# Patient Record
Sex: FEMALE | Race: WHITE | URBAN - METROPOLITAN AREA
[De-identification: names, ages, dates, MRNs, and addresses within clinical notes are randomized per-mention and may not be internally consistent; named-entity substitution may affect disease eponyms.]

---

## 2017-09-25 ENCOUNTER — HOSPITAL ENCOUNTER (OUTPATIENT)
Dept: SURGERY | Age: 82
Discharge: HOME | End: 2017-09-25
Attending: OPHTHALMOLOGY | Admitting: OPHTHALMOLOGY

## 2017-09-25 VITALS
OXYGEN SATURATION: 97 % | SYSTOLIC BLOOD PRESSURE: 151 MMHG | TEMPERATURE: 98.1 F | HEART RATE: 92 BPM | DIASTOLIC BLOOD PRESSURE: 71 MMHG | RESPIRATION RATE: 14 BRPM

## 2017-09-25 LAB
GLUCOSE BLD-MCNC: 171 MG/DL (ref 70–99)
GLUCOSE BLD-MCNC: 171 MG/DL (ref 70–99)
PERFORMED ON: ABNORMAL
PERFORMED ON: ABNORMAL

## 2017-09-25 RX ORDER — CIPROFLOXACIN HYDROCHLORIDE 3.5 MG/ML
1 SOLUTION/ DROPS TOPICAL SEE ADMIN INSTRUCTIONS
Status: DISCONTINUED | OUTPATIENT
Start: 2017-09-25 | End: 2017-09-26 | Stop reason: HOSPADM

## 2017-09-25 RX ORDER — SODIUM CHLORIDE 0.9 % (FLUSH) 0.9 %
10 SYRINGE (ML) INJECTION PRN
Status: DISCONTINUED | OUTPATIENT
Start: 2017-09-25 | End: 2017-09-25 | Stop reason: SDUPTHER

## 2017-09-25 RX ORDER — TETRACAINE HYDROCHLORIDE 5 MG/ML
1 SOLUTION OPHTHALMIC ONCE
Status: COMPLETED | OUTPATIENT
Start: 2017-09-25 | End: 2017-09-25

## 2017-09-25 RX ORDER — SODIUM CHLORIDE 0.9 % (FLUSH) 0.9 %
10 SYRINGE (ML) INJECTION PRN
Status: DISCONTINUED | OUTPATIENT
Start: 2017-09-25 | End: 2017-09-26 | Stop reason: HOSPADM

## 2017-09-25 RX ORDER — ONDANSETRON 2 MG/ML
4 INJECTION INTRAMUSCULAR; INTRAVENOUS
Status: ACTIVE | OUTPATIENT
Start: 2017-09-25 | End: 2017-09-25

## 2017-09-25 RX ORDER — SODIUM CHLORIDE 0.9 % (FLUSH) 0.9 %
10 SYRINGE (ML) INJECTION EVERY 12 HOURS SCHEDULED
Status: DISCONTINUED | OUTPATIENT
Start: 2017-09-25 | End: 2017-09-26 | Stop reason: HOSPADM

## 2017-09-25 RX ORDER — SODIUM CHLORIDE 0.9 % (FLUSH) 0.9 %
10 SYRINGE (ML) INJECTION EVERY 12 HOURS SCHEDULED
Status: DISCONTINUED | OUTPATIENT
Start: 2017-09-25 | End: 2017-09-25 | Stop reason: SDUPTHER

## 2017-09-25 RX ORDER — SODIUM CHLORIDE 9 MG/ML
INJECTION, SOLUTION INTRAVENOUS CONTINUOUS
Status: DISCONTINUED | OUTPATIENT
Start: 2017-09-25 | End: 2017-09-26 | Stop reason: HOSPADM

## 2017-09-25 RX ADMIN — Medication 0.1 ML: at 09:29

## 2017-09-25 RX ADMIN — CIPROFLOXACIN HYDROCHLORIDE 1 DROP: 3.5 SOLUTION/ DROPS TOPICAL at 09:29

## 2017-09-25 RX ADMIN — TETRACAINE HYDROCHLORIDE 1 DROP: 5 SOLUTION OPHTHALMIC at 09:29

## 2017-09-25 RX ADMIN — SODIUM CHLORIDE: 9 INJECTION, SOLUTION INTRAVENOUS at 09:34

## 2017-09-25 ASSESSMENT — PAIN SCALES - GENERAL
PAINLEVEL_OUTOF10: 0
PAINLEVEL_OUTOF10: 0

## 2017-09-25 ASSESSMENT — PAIN - FUNCTIONAL ASSESSMENT: PAIN_FUNCTIONAL_ASSESSMENT: 0-10

## 2017-09-25 ASSESSMENT — ENCOUNTER SYMPTOMS: SHORTNESS OF BREATH: 0

## 2017-10-08 ASSESSMENT — ENCOUNTER SYMPTOMS: SHORTNESS OF BREATH: 0

## 2017-10-08 NOTE — ANESTHESIA PRE-OP
Department of Anesthesiology  Preprocedure Note       Name:  Isrrael Simon   Age:  80 y.o.  :  1935                                          MRN:  [id number]         Date:  10/9/2017          [facility] Department of Anesthesiology  Pre-Anesthesia Evaluation/Consultation       Name:  Isrrael Simon                                         Age:  80 y.o. MRN:  [id number]           Procedure (Scheduled):  Right eye phaco IOL  Surgeon:  Dr. Abdoul Miller Antibiotics     Tetanus Toxoids Swelling    Tolinase [Tolazamide] Other (See Comments)     BLINDNESS    Erythromycin Rash    Pcn [Penicillins] Rash     There is no problem list on file for this patient. Past Medical History:   Diagnosis Date    Diabetes mellitus ([facility] [address]     Hyperlipidemia     Hypertension      Past Surgical History:   Procedure Laterality Date    CATARACT REMOVAL WITH IMPLANT Left     HYSTERECTOMY       Social History   Substance Use Topics    Smoking status: Never Smoker    Smokeless tobacco: Not on file    Alcohol use No     Medications  Current Outpatient Prescriptions on File Prior to Encounter   Medication Sig Dispense Refill    lovastatin (MEVACOR) 40 MG tablet Take 40 mg by mouth 2 times daily      metFORMIN (GLUCOPHAGE) 500 MG tablet Take 500 mg by mouth 2 times daily (with meals)      amLODIPine (NORVASC) 5 MG tablet Take 5 mg by mouth daily      loratadine (CLARITIN) 10 MG capsule Take 10 mg by mouth daily       No current facility-administered medications on file prior to encounter.       Current Outpatient Prescriptions   Medication Sig Dispense Refill    lovastatin (MEVACOR) 40 MG tablet Take 40 mg by mouth 2 times daily      metFORMIN (GLUCOPHAGE) 500 MG tablet Take 500 mg by mouth 2 times daily (with meals)      amLODIPine (NORVASC) 5 MG tablet Take 5 mg by mouth daily      loratadine (CLARITIN) 10 MG capsule Take 10 mg by mouth daily       Current Facility-Administered Medications   Medication Dose Route Frequency Provider Last Rate Last Dose    0.9 % sodium chloride infusion   Intravenous Continuous Teresa Hawkins MD        sodium chloride flush 0.9 % injection 10 mL  10 mL Intravenous 2 times per day Teresa Hawkins MD        sodium chloride flush 0.9 % injection 10 mL  10 mL Intravenous PRN Teresa Hawkins MD        famotidine (PEPCID) injection 20 mg  20 mg Intravenous Once Teresa Hawkins MD        povidone-iodine 5 % ophthalmic solution   Right Eye Once MD Marianela        ciprofloxacin (CILOXAN) 0.3 % ophthalmic solution 1 drop  1 drop Right Eye See [address], [place]   1 drop at 10/09/17 1028    cyclopentolate 1%, phenylephrine 2.5%, tropicamide 1%, ketorolac 0.5% ophthalmic solution  0.5 mL Right Eye See [address], [place]        lidocaine (AKTEN) 3.5 % ophthalmic gel   Right Eye See [address], [place]   1 drop at 10/09/17 1026    famotidine (PEPCID) 20 MG/2ML injection              Vital Signs (Current)   Vitals:    10/09/17 1017   BP: 179/80   Pulse: 96   Resp:    Temp:    SpO2:      Vital Signs Statistics (for past 48 hrs)     Temp  Av.6 °F (37 °C)  Min: 98.6 °F (37 °C)   Min taken time: 10/09/17 1013  Max: 98.6 °F (37 °C)   Max taken time: 10/09/17 1013  Pulse  Av  Min: 80   Min taken time: 10/09/17 1017  Max: 98   Max taken time: 10/09/17 1013  Resp  Av  Min: 23   Min taken time: 10/09/17 1013  Max: 23   Max taken time: 10/09/17 1013  BP  Min: 179/80   Min taken time: 10/09/17 1013  Max: 179/80   Max taken time: 10/09/17 1013  SpO2  Av %  Min: 100 %   Min taken time: 10/09/17 1013  Max: 100 %   Max taken time: 10/09/17 1013    BP Readings from Last 3 Encounters:   10/09/17 (!) 179/80   17 (!) 151/71     BMI  Body mass index is 29.29 kg/m².   Estimated body mass index is 29.29 kg/m² as calculated from the following:    Height as of this

## 2017-10-09 ENCOUNTER — HOSPITAL ENCOUNTER (OUTPATIENT)
Dept: SURGERY | Age: 82
Discharge: HOME | End: 2017-10-09
Attending: OPHTHALMOLOGY | Admitting: OPHTHALMOLOGY

## 2017-10-09 VITALS
OXYGEN SATURATION: 99 % | SYSTOLIC BLOOD PRESSURE: 135 MMHG | DIASTOLIC BLOOD PRESSURE: 64 MMHG | TEMPERATURE: 98.4 F | RESPIRATION RATE: 18 BRPM | HEIGHT: 60 IN | BODY MASS INDEX: 29.45 KG/M2 | HEART RATE: 93 BPM | WEIGHT: 150 LBS

## 2017-10-09 LAB
GLUCOSE BLD-MCNC: 156 MG/DL (ref 70–99)
GLUCOSE BLD-MCNC: 158 MG/DL (ref 70–99)
PERFORMED ON: ABNORMAL
PERFORMED ON: ABNORMAL

## 2017-10-09 RX ORDER — SODIUM CHLORIDE 0.9 % (FLUSH) 0.9 %
10 SYRINGE (ML) INJECTION EVERY 12 HOURS SCHEDULED
Status: DISCONTINUED | OUTPATIENT
Start: 2017-10-09 | End: 2017-10-10 | Stop reason: HOSPADM

## 2017-10-09 RX ORDER — SODIUM CHLORIDE 0.9 % (FLUSH) 0.9 %
10 SYRINGE (ML) INJECTION EVERY 12 HOURS SCHEDULED
Status: DISCONTINUED | OUTPATIENT
Start: 2017-10-09 | End: 2017-10-09 | Stop reason: SDUPTHER

## 2017-10-09 RX ORDER — SODIUM CHLORIDE 0.9 % (FLUSH) 0.9 %
10 SYRINGE (ML) INJECTION PRN
Status: DISCONTINUED | OUTPATIENT
Start: 2017-10-09 | End: 2017-10-10 | Stop reason: HOSPADM

## 2017-10-09 RX ORDER — SODIUM CHLORIDE 9 MG/ML
INJECTION, SOLUTION INTRAVENOUS CONTINUOUS
Status: DISCONTINUED | OUTPATIENT
Start: 2017-10-09 | End: 2017-10-10 | Stop reason: HOSPADM

## 2017-10-09 RX ORDER — SODIUM CHLORIDE 0.9 % (FLUSH) 0.9 %
10 SYRINGE (ML) INJECTION PRN
Status: DISCONTINUED | OUTPATIENT
Start: 2017-10-09 | End: 2017-10-09 | Stop reason: SDUPTHER

## 2017-10-09 RX ORDER — CIPROFLOXACIN HYDROCHLORIDE 3.5 MG/ML
1 SOLUTION/ DROPS TOPICAL SEE ADMIN INSTRUCTIONS
Status: DISCONTINUED | OUTPATIENT
Start: 2017-10-09 | End: 2017-10-10 | Stop reason: HOSPADM

## 2017-10-09 RX ORDER — TETRACAINE HYDROCHLORIDE 5 MG/ML
1 SOLUTION OPHTHALMIC ONCE
Status: COMPLETED | OUTPATIENT
Start: 2017-10-09 | End: 2017-10-09

## 2017-10-09 RX ADMIN — CIPROFLOXACIN HYDROCHLORIDE 1 DROP: 3.5 SOLUTION/ DROPS TOPICAL at 10:23

## 2017-10-09 RX ADMIN — TETRACAINE HYDROCHLORIDE 1 DROP: 5 SOLUTION OPHTHALMIC at 10:21

## 2017-10-09 RX ADMIN — CIPROFLOXACIN HYDROCHLORIDE 1 DROP: 3.5 SOLUTION/ DROPS TOPICAL at 10:28

## 2017-10-09 RX ADMIN — SODIUM CHLORIDE: 9 INJECTION, SOLUTION INTRAVENOUS at 10:35

## 2017-10-09 ASSESSMENT — PAIN SCALES - GENERAL
PAINLEVEL_OUTOF10: 0
PAINLEVEL_OUTOF10: 0

## 2017-10-09 ASSESSMENT — PAIN - FUNCTIONAL ASSESSMENT: PAIN_FUNCTIONAL_ASSESSMENT: 0-10

## 2017-10-09 NOTE — PROGRESS NOTES
Pt brought to bay 1 for pre-op admission. Pt alert and oriented. Pt name, , allergies, procedure and laterality verified.  Doc Singh RN

## 2017-10-09 NOTE — PROGRESS NOTES
Head of bed elevated, instructions given to patient and family, IOL card given to patient, patient denies nausea, denies pain. NO issues.

## 2017-10-09 NOTE — OP NOTE
[facility] [address]  [address], [place] [address]  [phone]      10/9/2017    Patient name: Joseph Valverde  YOB: 1935  MRN: [id number]    [place]: Allergies   Allergen Reactions    Iodides     Sulfa Antibiotics     Tetanus Toxoids Swelling    Tolinase [Tolazamide] Other (See Comments)     BLINDNESS    Erythromycin Rash    Pcn [Penicillins] Rash       Pre-operative diagnosis:  Cataract Right Eye    Post-operative diagnosis:  Same    Procedure Performed:  Phacoemulsification with insertion of a foldable posterior chamber intraocular lens implant to the right eye. Anesthesia:  Topical Anesthesia with MAC. Estimated Blood Loss: None    Specimens removed: None    Limbal Relaxing Incisions:  Axis:N/A    Arc Length: N/A    Complications:  None    Description of Procedure: In the same day surgery center, the patient was given the usual pre-operative regimen. In the operating room suite, the right eye was prepped and draped in the usual sterile fashion. A paracentesis tract was fashioned, after which 0.5 MI of 1% preservative free Lidocaine was injected into the anterior chamber followed by Viscoat for a complete chamber fill. A clear cornea temporal tunnel was created using a keratome. Under optimal magnification and visualization, a continuous curvilinear capsulorrhexis was performed. Hydro-dissection of the lens was carried out using balanced salt solution. The lens was then phacoemulsified using the divide and conquer technique with a total CDE of 10.47. Residual cortex was removed using the I/A handpiece followed by thorough posterior capsule polishing. The capsular bar was then filled with Provisc and the lens was gently delivered into the bag, achieving excellent centration. Triamcinolone/Moxifloxacin was not injected 3.5mm posterior to the limbus.  Provisc was removed using the I/A handpiece and the globe was pressurized using balanced

## 2017-10-09 NOTE — ANESTHESIA POST-OP
Anesthesia Post-op Note    Patient: Sukh Amador  MRN: [id number]  YOB: 1935  Date of evaluation: 10/9/2017  Time:  11:31 [address] [facility] Department of Anesthesiology  Post-Anesthesia Note       Name:  Sukh Amador                                  Age:  80 y.o. MRN:  [id number]     Last Vitals & Oxygen Saturation: /64   Pulse 93   Temp 98.4 °F (36.9 °C) (Temporal)   Resp 18   Ht 5' (1.524 m)   Wt 150 lb (68 kg)   SpO2 99%   BMI 29.29 kg/m²   Patient Vitals for the past 4 hrs:   BP Temp Temp src Pulse Resp SpO2 Height Weight   10/09/17 1126 135/64 98.4 °F (36.9 °C) Temporal 93 18 99 % - -   10/09/17 1111 - - - 97 - - - -   10/09/17 1109 133/63 98.6 °F (37 °C) Temporal 97 16 97 % - -   10/09/17 1017 - - - 96 - - - -   10/09/17 1013 (!) 179/80 98.6 °F (37 °C) Tympanic 98 19 100 % 5' (1.524 m) 150 lb (68 kg)       Level of consciousness:  Awake, alert    Respiratory: Respirations easy, no distress. Stable. Cardiovascular: Hemodynamically stable. Hydration: Adequate. PONV: Adequately managed. Post-op pain: Adequately controlled. Post-op assessment: Tolerated anesthetic well without complication. Complications:  None.     Robyn Hoang MD  October 9, 2017   11:31 AM      Anesthesia Post Evaluation    Robyn Hoang MD  11:31 AM